# Patient Record
Sex: MALE | Race: BLACK OR AFRICAN AMERICAN | URBAN - METROPOLITAN AREA
[De-identification: names, ages, dates, MRNs, and addresses within clinical notes are randomized per-mention and may not be internally consistent; named-entity substitution may affect disease eponyms.]

---

## 2021-09-09 ENCOUNTER — OFFICE VISIT (OUTPATIENT)
Dept: INTERNAL MEDICINE CLINIC | Age: 56
End: 2021-09-09
Payer: COMMERCIAL

## 2021-09-09 VITALS
HEART RATE: 97 BPM | OXYGEN SATURATION: 98 % | DIASTOLIC BLOOD PRESSURE: 80 MMHG | WEIGHT: 177 LBS | SYSTOLIC BLOOD PRESSURE: 158 MMHG | BODY MASS INDEX: 30.38 KG/M2

## 2021-09-09 DIAGNOSIS — H53.9 VISION CHANGES: ICD-10-CM

## 2021-09-09 DIAGNOSIS — Z12.5 PROSTATE CANCER SCREENING: ICD-10-CM

## 2021-09-09 DIAGNOSIS — I10 ESSENTIAL HYPERTENSION: Primary | ICD-10-CM

## 2021-09-09 DIAGNOSIS — E78.2 MIXED HYPERLIPIDEMIA: ICD-10-CM

## 2021-09-09 DIAGNOSIS — Z12.11 COLON CANCER SCREENING: ICD-10-CM

## 2021-09-09 PROCEDURE — 99205 OFFICE O/P NEW HI 60 MIN: CPT | Performed by: INTERNAL MEDICINE

## 2021-09-09 RX ORDER — AMLODIPINE BESYLATE 5 MG/1
5 TABLET ORAL DAILY
Qty: 30 TABLET | Refills: 3 | Status: SHIPPED | OUTPATIENT
Start: 2021-09-09

## 2021-09-09 SDOH — ECONOMIC STABILITY: FOOD INSECURITY: WITHIN THE PAST 12 MONTHS, YOU WORRIED THAT YOUR FOOD WOULD RUN OUT BEFORE YOU GOT MONEY TO BUY MORE.: NEVER TRUE

## 2021-09-09 SDOH — ECONOMIC STABILITY: FOOD INSECURITY: WITHIN THE PAST 12 MONTHS, THE FOOD YOU BOUGHT JUST DIDN'T LAST AND YOU DIDN'T HAVE MONEY TO GET MORE.: NEVER TRUE

## 2021-09-09 ASSESSMENT — SOCIAL DETERMINANTS OF HEALTH (SDOH): HOW HARD IS IT FOR YOU TO PAY FOR THE VERY BASICS LIKE FOOD, HOUSING, MEDICAL CARE, AND HEATING?: NOT HARD AT ALL

## 2021-09-09 ASSESSMENT — PATIENT HEALTH QUESTIONNAIRE - PHQ9
1. LITTLE INTEREST OR PLEASURE IN DOING THINGS: 0
SUM OF ALL RESPONSES TO PHQ QUESTIONS 1-9: 0
SUM OF ALL RESPONSES TO PHQ QUESTIONS 1-9: 0
SUM OF ALL RESPONSES TO PHQ9 QUESTIONS 1 & 2: 0
2. FEELING DOWN, DEPRESSED OR HOPELESS: 0
SUM OF ALL RESPONSES TO PHQ QUESTIONS 1-9: 0

## 2021-10-09 ASSESSMENT — ENCOUNTER SYMPTOMS
GASTROINTESTINAL NEGATIVE: 1
RESPIRATORY NEGATIVE: 1

## 2021-10-09 NOTE — PROGRESS NOTES
Patient: Garrett Mcdonnell is a 64 y.o. male who presents today with the following Chief Complaint(s):    Chief Complaint   Patient presents with    Annual Exam         HPI He is here for a check up and new to the medical practice. He has a h/o hypertension, and current not taking medication. He is focused on eating better and regular physical activity. Current Outpatient Medications   Medication Sig Dispense Refill    amLODIPine (NORVASC) 5 MG tablet Take 1 tablet by mouth daily 30 tablet 3     No current facility-administered medications for this visit. Patient's past medical history, surgical history, family history, medications,and allergies  were all reviewed and updated as appropriate today. Review of Systems   Constitutional:        Elevated BMI at 30.4. see HPI. HENT: Negative. Eyes:        Complains of decreased visual acuity. Respiratory: Negative. Cardiovascular:        Hypertension, see HPI. Gastrointestinal: Negative. Endocrine:        Dyslipidemia, , HDL 38. Genitourinary: Negative. Musculoskeletal: Negative. Skin: Negative. Neurological: Negative. Psychiatric/Behavioral: Negative. Physical Exam  Constitutional:       General: He is not in acute distress. Appearance: He is well-developed. HENT:      Head: Normocephalic and atraumatic. Right Ear: External ear normal.      Left Ear: External ear normal.      Nose: Nose normal.   Eyes:      General: No scleral icterus. Conjunctiva/sclera: Conjunctivae normal.      Pupils: Pupils are equal, round, and reactive to light. Neck:      Thyroid: No thyromegaly. Cardiovascular:      Rate and Rhythm: Normal rate and regular rhythm. Heart sounds: Normal heart sounds. Pulmonary:      Effort: Pulmonary effort is normal.      Breath sounds: Normal breath sounds. Abdominal:      General: Bowel sounds are normal.      Palpations: Abdomen is soft. There is no mass. Musculoskeletal:      Cervical back: Normal range of motion and neck supple. Lymphadenopathy:      Cervical: No cervical adenopathy. Skin:     General: Skin is warm and dry. Neurological:      Mental Status: He is alert and oriented to person, place, and time. Deep Tendon Reflexes: Reflexes are normal and symmetric. Psychiatric:         Behavior: Behavior normal.         Thought Content: Thought content normal.         Judgment: Judgment normal.         Vitals:    09/09/21 1021   BP: (!) 158/80   Pulse: 97   SpO2: 98%       Assessment:  Encounter Diagnoses   Name Primary?  Essential hypertension Yes    Prostate cancer screening     Mixed hyperlipidemia     Colon cancer screening     Vision changes        Plan:  1. Essential hypertension  DASH and low sodium diet discussed. - COMPREHENSIVE METABOLIC PANEL; Future  - CBC WITH AUTO DIFFERENTIAL; Future  - TSH with Reflex; Future  - MICROALBUMIN / CREATININE URINE RATIO; Future  - amLODIPine (NORVASC) 5 MG tablet; Take 1 tablet by mouth daily  Dispense: 30 tablet; Refill: 3    2. Prostate cancer screening  Discussed rational behind screening.   - Psa screening; Future    3. Mixed hyperlipidemia  Heart healthy diet and statin compliance discussed. - Lipid Panel; Future    4. Colon cancer screening  Discussed importance and rational for screening.   - Jayashree Robertson MD, Gastroenterology, [facility]    5.  Vision changes  - External Referral To Ophthalmology

## 2021-10-19 ENCOUNTER — OFFICE VISIT (OUTPATIENT)
Dept: INTERNAL MEDICINE CLINIC | Age: 56
End: 2021-10-19
Payer: COMMERCIAL

## 2021-10-19 VITALS
BODY MASS INDEX: 29.6 KG/M2 | WEIGHT: 173.4 LBS | HEIGHT: 64 IN | DIASTOLIC BLOOD PRESSURE: 68 MMHG | SYSTOLIC BLOOD PRESSURE: 130 MMHG | OXYGEN SATURATION: 97 % | HEART RATE: 91 BPM

## 2021-10-19 DIAGNOSIS — M25.551 RIGHT HIP PAIN: ICD-10-CM

## 2021-10-19 DIAGNOSIS — N50.89 SCROTAL MASSES: ICD-10-CM

## 2021-10-19 DIAGNOSIS — I10 PRIMARY HYPERTENSION: Primary | ICD-10-CM

## 2021-10-19 PROCEDURE — 99214 OFFICE O/P EST MOD 30 MIN: CPT | Performed by: INTERNAL MEDICINE

## 2021-10-19 RX ORDER — MELOXICAM 7.5 MG/1
7.5 TABLET ORAL 2 TIMES DAILY PRN
Qty: 30 TABLET | Refills: 1 | Status: SHIPPED | OUTPATIENT
Start: 2021-10-19 | End: 2021-11-22

## 2021-10-19 NOTE — PROGRESS NOTES
Patient: Nicolette Campos is a 64 y.o. male who presents today with the following Chief Complaint(s):    Chief Complaint   Patient presents with    Hypertension    Hip Pain     right hip pain         HPIHe is here for a check up and f/u on hypertension. He is taking medication as prescribed and is aware of the importance of healthy dietary practices and regular physical activity. He complains of right hip pain without history of trauma. No prior injury. Hurting for about 1 year with ambulation, and flexion. Concerned about lumps in his scrotal sacs. Not painful. Present for years. May have changed in size. Current Outpatient Medications   Medication Sig Dispense Refill    amLODIPine (NORVASC) 5 MG tablet Take 1 tablet by mouth daily 30 tablet 3     No current facility-administered medications for this visit. Patient's past medical history, surgical history, family history, medications,and allergies  were all reviewed and updated as appropriate today. Review of Systems   Constitutional: Negative. HENT: Negative. Eyes: Negative. Respiratory: Negative. Cardiovascular:        Hypertension treated with amlodipine. Gastrointestinal: Negative. Genitourinary:        Scrotal masses, see HPI. Musculoskeletal:        Right hip pain. See HPI. Skin: Negative. Neurological: Negative. Psychiatric/Behavioral: Negative. Physical Exam  Constitutional:       General: He is not in acute distress. Appearance: He is well-developed. HENT:      Head: Normocephalic and atraumatic. Right Ear: External ear normal.      Left Ear: External ear normal.      Nose: Nose normal.   Eyes:      General: No scleral icterus. Conjunctiva/sclera: Conjunctivae normal.      Pupils: Pupils are equal, round, and reactive to light. Neck:      Thyroid: No thyromegaly. Cardiovascular:      Rate and Rhythm: Normal rate and regular rhythm.       Heart sounds: Normal heart sounds. Pulmonary:      Effort: Pulmonary effort is normal.      Breath sounds: Normal breath sounds. Abdominal:      General: Bowel sounds are normal.      Palpations: Abdomen is soft. There is no mass. Genitourinary:     Comments: Cystocele type masses in both scrotal sacs. Musculoskeletal:      Cervical back: Normal range of motion and neck supple. Comments: Right hip, rom ok. Some some with flexion of hip. Tender joint to palpate. Lymphadenopathy:      Cervical: No cervical adenopathy. Skin:     General: Skin is warm and dry. Neurological:      Mental Status: He is alert and oriented to person, place, and time. Deep Tendon Reflexes: Reflexes are normal and symmetric. Psychiatric:         Behavior: Behavior normal.         Thought Content: Thought content normal.         Judgment: Judgment normal.         Vitals:    10/19/21 1504   BP: 130/68   Pulse: 91   SpO2: 97%       Assessment:   Diagnosis Orders   1. Primary hypertension  Continue amlodipine. DASH diet discussed. 2. Right hip pain  Suspect arthritis. Exercises given. XR HIP RIGHT (2-3 VIEWS)    External Referral To Orthopedic Surgery   3. Scrotal masses  Discussed differential diagnosis. Kianna Mittal MD, Urology, [facility]       Plan:  See plans above.

## 2021-10-22 ENCOUNTER — HOSPITAL ENCOUNTER (OUTPATIENT)
Dept: GENERAL RADIOLOGY | Age: 56
Discharge: HOME | End: 2021-10-22
Payer: COMMERCIAL

## 2021-10-22 ENCOUNTER — HOSPITAL ENCOUNTER (OUTPATIENT)
Age: 56
Discharge: HOME | End: 2021-10-22
Payer: COMMERCIAL

## 2021-10-22 DIAGNOSIS — M25.551 RIGHT HIP PAIN: ICD-10-CM

## 2021-10-22 PROCEDURE — 73502 X-RAY EXAM HIP UNI 2-3 VIEWS: CPT

## 2021-10-31 ASSESSMENT — ENCOUNTER SYMPTOMS
GASTROINTESTINAL NEGATIVE: 1
RESPIRATORY NEGATIVE: 1
EYES NEGATIVE: 1

## 2021-11-22 RX ORDER — MELOXICAM 7.5 MG/1
TABLET ORAL
Qty: 30 TABLET | Refills: 1 | Status: SHIPPED | OUTPATIENT
Start: 2021-11-22 | End: 2022-01-12

## 2022-01-12 RX ORDER — MELOXICAM 7.5 MG/1
TABLET ORAL
Qty: 30 TABLET | Refills: 1 | Status: SHIPPED | OUTPATIENT
Start: 2022-01-12

## 2023-02-08 ENCOUNTER — TELEPHONE (OUTPATIENT)
Dept: INTERNAL MEDICINE CLINIC | Age: 58
End: 2023-02-08

## 2024-01-05 ENCOUNTER — OFFICE VISIT (OUTPATIENT)
Dept: INTERNAL MEDICINE CLINIC | Age: 59
End: 2024-01-05

## 2024-01-05 VITALS
OXYGEN SATURATION: 97 % | WEIGHT: 173.4 LBS | DIASTOLIC BLOOD PRESSURE: 78 MMHG | HEART RATE: 87 BPM | BODY MASS INDEX: 29.76 KG/M2 | SYSTOLIC BLOOD PRESSURE: 130 MMHG

## 2024-01-05 DIAGNOSIS — Z12.5 PROSTATE CANCER SCREENING: ICD-10-CM

## 2024-01-05 DIAGNOSIS — E78.2 MIXED HYPERLIPIDEMIA: ICD-10-CM

## 2024-01-05 DIAGNOSIS — M16.11 ARTHRITIS OF RIGHT HIP: Primary | ICD-10-CM

## 2024-01-05 DIAGNOSIS — I10 ESSENTIAL HYPERTENSION: ICD-10-CM

## 2024-01-05 RX ORDER — MELOXICAM 7.5 MG/1
TABLET ORAL
Qty: 30 TABLET | Refills: 1 | Status: SHIPPED | OUTPATIENT
Start: 2024-01-05

## 2024-01-05 RX ORDER — AMLODIPINE BESYLATE 5 MG/1
5 TABLET ORAL DAILY
Qty: 30 TABLET | Refills: 3 | Status: CANCELLED | OUTPATIENT
Start: 2024-01-05

## 2024-01-05 ASSESSMENT — PATIENT HEALTH QUESTIONNAIRE - PHQ9
1. LITTLE INTEREST OR PLEASURE IN DOING THINGS: 0
SUM OF ALL RESPONSES TO PHQ QUESTIONS 1-9: 0
2. FEELING DOWN, DEPRESSED OR HOPELESS: 0
SUM OF ALL RESPONSES TO PHQ9 QUESTIONS 1 & 2: 0
SUM OF ALL RESPONSES TO PHQ QUESTIONS 1-9: 0

## 2024-01-05 NOTE — PROGRESS NOTES
Patient: Alcides Stanley is a 58 y.o. male who presents today with the following Chief Complaint(s):    Chief Complaint   Patient presents with    Annual Exam    Hip Pain         HPIright hip pain. Severe arthritis.   Off b/p med.   Exercises, eats healthy.   Current Outpatient Medications   Medication Sig Dispense Refill    meloxicam (MOBIC) 7.5 MG tablet TAKE 1 TABLET BY MOUTH TWICE DAILY AS NEEDED FOR PAIN 30 tablet 1    amLODIPine (NORVASC) 5 MG tablet Take 1 tablet by mouth daily (Patient not taking: Reported on 1/5/2024) 30 tablet 3     No current facility-administered medications for this visit.       Patient's past medical history, surgical history, family history, medications,and allergies  were all reviewed and updated as appropriate today.      Review of Systems      Physical Exam    Vitals:    01/05/24 1232   BP: 130/78   Pulse: 87   SpO2: 97%       Assessment:  Encounter Diagnosis   Name Primary?    Essential hypertension        Plan:  1. Essential hypertension  ***

## 2024-01-13 ASSESSMENT — ENCOUNTER SYMPTOMS
EYES NEGATIVE: 1
RESPIRATORY NEGATIVE: 1
GASTROINTESTINAL NEGATIVE: 1

## 2024-02-05 DIAGNOSIS — M16.11 ARTHRITIS OF RIGHT HIP: ICD-10-CM

## 2024-02-05 RX ORDER — MELOXICAM 7.5 MG/1
TABLET ORAL
Qty: 30 TABLET | Refills: 1 | Status: SHIPPED | OUTPATIENT
Start: 2024-02-05

## 2024-02-05 NOTE — TELEPHONE ENCOUNTER
Medication:   Requested Prescriptions     Pending Prescriptions Disp Refills    meloxicam (MOBIC) 7.5 MG tablet 30 tablet 1     Sig: TAKE 1 TABLET BY MOUTH TWICE DAILY AS NEEDED FOR PAIN        Last Filled:  1/5/24    Last appt: 1/5/2024   Next appt: 3/15/2024    Last OARRS:        No data to display

## 2024-03-07 DIAGNOSIS — M16.11 ARTHRITIS OF RIGHT HIP: ICD-10-CM

## 2024-03-07 RX ORDER — MELOXICAM 7.5 MG/1
TABLET ORAL
Qty: 30 TABLET | Refills: 1 | OUTPATIENT
Start: 2024-03-07

## 2024-03-10 DIAGNOSIS — M16.11 ARTHRITIS OF RIGHT HIP: ICD-10-CM

## 2024-03-12 DIAGNOSIS — M16.11 ARTHRITIS OF RIGHT HIP: ICD-10-CM

## 2024-03-12 RX ORDER — MELOXICAM 7.5 MG/1
TABLET ORAL
Qty: 30 TABLET | Refills: 1 | OUTPATIENT
Start: 2024-03-12

## 2024-03-12 RX ORDER — MELOXICAM 7.5 MG/1
TABLET ORAL
Qty: 30 TABLET | Refills: 1 | Status: SHIPPED | OUTPATIENT
Start: 2024-03-12

## 2024-03-12 NOTE — TELEPHONE ENCOUNTER
Medication:   Requested Prescriptions     Pending Prescriptions Disp Refills    meloxicam (MOBIC) 7.5 MG tablet 30 tablet 1     Sig: TAKE 1 TABLET BY MOUTH TWICE DAILY AS NEEDED FOR PAIN     Last Filled:  2/5/24    Last appt: 1/5/2024   Next appt: 3/12/2024    Last OARRS:        No data to display

## 2024-04-08 DIAGNOSIS — M16.11 ARTHRITIS OF RIGHT HIP: ICD-10-CM

## 2024-04-09 RX ORDER — MELOXICAM 7.5 MG/1
TABLET ORAL
Qty: 30 TABLET | Refills: 1 | Status: SHIPPED | OUTPATIENT
Start: 2024-04-09

## 2024-04-09 NOTE — TELEPHONE ENCOUNTER
Medication:   Requested Prescriptions     Pending Prescriptions Disp Refills    meloxicam (MOBIC) 7.5 MG tablet [Pharmacy Med Name: MELOXICAM 7.5MG TABLETS] 30 tablet 1     Sig: TAKE 1 TABLET BY MOUTH TWICE DAILY AS NEEDED FOR PAIN      3/26/2024   Last Filled:      Patient Phone Number: [phone] (home)     Last appt: 1/5/2024   Next appt: 4/12/2024    Last OARRS:        No data to display

## 2024-05-16 DIAGNOSIS — M16.11 ARTHRITIS OF RIGHT HIP: ICD-10-CM

## 2024-05-16 NOTE — TELEPHONE ENCOUNTER
Medication:   Requested Prescriptions     Pending Prescriptions Disp Refills    meloxicam (MOBIC) 7.5 MG tablet [Pharmacy Med Name: MELOXICAM 7.5MG TABLETS] 30 tablet 1     Sig: TAKE 1 TABLET BY MOUTH TWICE DAILY AS NEEDED FOR PAIN        Last Filled: 4/9/4     Last appt: 1/5/2024   Next appt: Visit date not found   Return in about 3 months (around 4/5/2024).  Last OARRS:        No data to display

## 2024-05-18 RX ORDER — MELOXICAM 7.5 MG/1
TABLET ORAL
Qty: 30 TABLET | Refills: 1 | Status: SHIPPED | OUTPATIENT
Start: 2024-05-18

## 2024-08-18 DIAGNOSIS — I10 ESSENTIAL HYPERTENSION: ICD-10-CM

## 2024-08-19 DIAGNOSIS — I10 ESSENTIAL HYPERTENSION: ICD-10-CM

## 2024-08-19 RX ORDER — AMLODIPINE BESYLATE 5 MG/1
5 TABLET ORAL DAILY
Qty: 30 TABLET | Refills: 5 | Status: SHIPPED | OUTPATIENT
Start: 2024-08-19

## 2024-08-19 RX ORDER — AMLODIPINE BESYLATE 5 MG/1
5 TABLET ORAL DAILY
Qty: 90 TABLET | OUTPATIENT
Start: 2024-08-19

## 2024-12-04 DIAGNOSIS — M25.551 RIGHT HIP PAIN: Primary | ICD-10-CM

## 2024-12-04 RX ORDER — TRAMADOL HYDROCHLORIDE 50 MG/1
50 TABLET ORAL 3 TIMES DAILY PRN
Qty: 21 TABLET | Refills: 0 | Status: SHIPPED | OUTPATIENT
Start: 2024-12-04 | End: 2024-12-11

## 2024-12-21 ENCOUNTER — HOSPITAL ENCOUNTER (OUTPATIENT)
Age: 59
Discharge: HOME | End: 2024-12-21
Payer: COMMERCIAL

## 2024-12-21 DIAGNOSIS — I10 ESSENTIAL HYPERTENSION: ICD-10-CM

## 2024-12-21 DIAGNOSIS — Z12.5 PROSTATE CANCER SCREENING: ICD-10-CM

## 2024-12-21 DIAGNOSIS — E78.2 MIXED HYPERLIPIDEMIA: ICD-10-CM

## 2024-12-21 LAB
ALBUMIN SERPL-MCNC: 4.4 G/DL (ref 3.4–5)
ALBUMIN/GLOB SERPL: 1.3 {RATIO} (ref 1.1–2.2)
ALP SERPL-CCNC: 94 U/L (ref 40–129)
ALT SERPL-CCNC: 24 U/L (ref 10–40)
ANION GAP SERPL CALCULATED.3IONS-SCNC: 11 MMOL/L (ref 3–16)
AST SERPL-CCNC: 25 U/L (ref 15–37)
BASOPHILS # BLD: 0 K/UL (ref 0–0.2)
BASOPHILS NFR BLD: 0.7 %
BILIRUB SERPL-MCNC: 0.8 MG/DL (ref 0–1)
BUN SERPL-MCNC: 12 MG/DL (ref 7–20)
CALCIUM SERPL-MCNC: 9.7 MG/DL (ref 8.3–10.6)
CHLORIDE SERPL-SCNC: 101 MMOL/L (ref 99–110)
CHOLEST SERPL-MCNC: 149 MG/DL (ref 0–199)
CO2 SERPL-SCNC: 28 MMOL/L (ref 21–32)
CREAT SERPL-MCNC: 1 MG/DL (ref 0.9–1.3)
CREAT UR-MCNC: 83.8 MG/DL (ref 39–259)
DEPRECATED RDW RBC AUTO: 14.1 % (ref 12.4–15.4)
EOSINOPHIL # BLD: 0.2 K/UL (ref 0–0.6)
EOSINOPHIL NFR BLD: 4.3 %
GFR SERPLBLD CREATININE-BSD FMLA CKD-EPI: 86 ML/MIN/{1.73_M2}
GLUCOSE SERPL-MCNC: 86 MG/DL (ref 70–99)
HCT VFR BLD AUTO: 47.5 % (ref 40.5–52.5)
HDLC SERPL-MCNC: 40 MG/DL (ref 40–60)
HGB BLD-MCNC: 15.4 G/DL (ref 13.5–17.5)
LDLC SERPL CALC-MCNC: 95 MG/DL
LYMPHOCYTES # BLD: 1.6 K/UL (ref 1–5.1)
LYMPHOCYTES NFR BLD: 29 %
MCH RBC QN AUTO: 27.1 PG (ref 26–34)
MCHC RBC AUTO-ENTMCNC: 32.5 G/DL (ref 31–36)
MCV RBC AUTO: 83.5 FL (ref 80–100)
MICROALBUMIN UR DL<=1MG/L-MCNC: <1.2 MG/DL
MICROALBUMIN/CREAT UR: NORMAL MG/G (ref 0–30)
MONOCYTES # BLD: 0.5 K/UL (ref 0–1.3)
MONOCYTES NFR BLD: 9.6 %
NEUTROPHILS # BLD: 3.1 K/UL (ref 1.7–7.7)
NEUTROPHILS NFR BLD: 56.4 %
PLATELET # BLD AUTO: 243 K/UL (ref 135–450)
PMV BLD AUTO: 9.4 FL (ref 5–10.5)
POTASSIUM SERPL-SCNC: 4.3 MMOL/L (ref 3.5–5.1)
PROT SERPL-MCNC: 7.8 G/DL (ref 6.4–8.2)
PSA SERPL DL<=0.01 NG/ML-MCNC: 5.85 NG/ML (ref 0–4)
RBC # BLD AUTO: 5.69 M/UL (ref 4.2–5.9)
SODIUM SERPL-SCNC: 140 MMOL/L (ref 136–145)
TRIGL SERPL-MCNC: 69 MG/DL (ref 0–150)
TSH SERPL DL<=0.005 MIU/L-ACNC: 1.01 UIU/ML (ref 0.27–4.2)
VLDLC SERPL CALC-MCNC: 14 MG/DL
WBC # BLD AUTO: 5.5 K/UL (ref 4–11)

## 2024-12-21 PROCEDURE — 82043 UR ALBUMIN QUANTITATIVE: CPT

## 2024-12-21 PROCEDURE — 84443 ASSAY THYROID STIM HORMONE: CPT

## 2024-12-21 PROCEDURE — 84153 ASSAY OF PSA TOTAL: CPT

## 2024-12-21 PROCEDURE — 85025 COMPLETE CBC W/AUTO DIFF WBC: CPT

## 2024-12-21 PROCEDURE — 80053 COMPREHEN METABOLIC PANEL: CPT

## 2024-12-21 PROCEDURE — 80061 LIPID PANEL: CPT

## 2024-12-21 PROCEDURE — 36415 COLL VENOUS BLD VENIPUNCTURE: CPT

## 2024-12-21 PROCEDURE — 82570 ASSAY OF URINE CREATININE: CPT

## 2025-01-01 DIAGNOSIS — M16.11 ARTHRITIS OF RIGHT HIP: ICD-10-CM

## 2025-01-02 RX ORDER — MELOXICAM 7.5 MG/1
TABLET ORAL
Qty: 30 TABLET | Refills: 1 | Status: SHIPPED | OUTPATIENT
Start: 2025-01-02

## 2025-01-02 NOTE — TELEPHONE ENCOUNTER
Medication:   Requested Prescriptions     Pending Prescriptions Disp Refills    meloxicam (MOBIC) 7.5 MG tablet 30 tablet 1     Sig: TAKE 1 TABLET BY MOUTH TWICE DAILY AS NEEDED FOR PAIN        Last Filled:      Patient Phone Number: [phone] (home)     Last appt: 1/5/2024   Next appt: 1/7/2025    Last OARRS:        No data to display

## 2025-01-04 ASSESSMENT — PATIENT HEALTH QUESTIONNAIRE - PHQ9
SUM OF ALL RESPONSES TO PHQ QUESTIONS 1-9: 0
1. LITTLE INTEREST OR PLEASURE IN DOING THINGS: NOT AT ALL
SUM OF ALL RESPONSES TO PHQ QUESTIONS 1-9: 0
SUM OF ALL RESPONSES TO PHQ9 QUESTIONS 1 & 2: 0
SUM OF ALL RESPONSES TO PHQ9 QUESTIONS 1 & 2: 0
2. FEELING DOWN, DEPRESSED OR HOPELESS: NOT AT ALL
SUM OF ALL RESPONSES TO PHQ QUESTIONS 1-9: 0
1. LITTLE INTEREST OR PLEASURE IN DOING THINGS: NOT AT ALL
2. FEELING DOWN, DEPRESSED OR HOPELESS: NOT AT ALL
SUM OF ALL RESPONSES TO PHQ QUESTIONS 1-9: 0

## 2025-01-07 ENCOUNTER — OFFICE VISIT (OUTPATIENT)
Dept: INTERNAL MEDICINE CLINIC | Age: 60
End: 2025-01-07

## 2025-01-07 VITALS
BODY MASS INDEX: 29.52 KG/M2 | WEIGHT: 172 LBS | SYSTOLIC BLOOD PRESSURE: 156 MMHG | HEART RATE: 82 BPM | DIASTOLIC BLOOD PRESSURE: 86 MMHG | OXYGEN SATURATION: 97 %

## 2025-01-07 DIAGNOSIS — M25.551 RIGHT HIP PAIN: ICD-10-CM

## 2025-01-07 DIAGNOSIS — R97.20 ELEVATED PSA: ICD-10-CM

## 2025-01-07 DIAGNOSIS — Z12.11 COLON CANCER SCREENING: ICD-10-CM

## 2025-01-07 DIAGNOSIS — I10 PRIMARY HYPERTENSION: ICD-10-CM

## 2025-01-07 DIAGNOSIS — Z23 NEED FOR SHINGLES VACCINE: ICD-10-CM

## 2025-01-07 DIAGNOSIS — E78.2 MIXED HYPERLIPIDEMIA: ICD-10-CM

## 2025-01-07 RX ORDER — AMLODIPINE BESYLATE 5 MG/1
5 TABLET ORAL DAILY
Qty: 30 TABLET | Refills: 5 | Status: SHIPPED | OUTPATIENT
Start: 2025-01-07

## 2025-01-07 RX ORDER — ZOSTER VACCINE RECOMBINANT, ADJUVANTED 50 MCG/0.5
0.5 KIT INTRAMUSCULAR ONCE
Qty: 0.5 ML | Refills: 0
Start: 2025-01-07 | End: 2025-01-07

## 2025-01-07 RX ORDER — MELOXICAM 15 MG/1
15 TABLET ORAL DAILY PRN
Qty: 30 TABLET | Refills: 1 | Status: SHIPPED | OUTPATIENT
Start: 2025-01-07

## 2025-01-07 RX ORDER — TRAMADOL HYDROCHLORIDE 50 MG/1
50 TABLET ORAL 3 TIMES DAILY PRN
Qty: 21 TABLET | Refills: 0 | Status: SHIPPED | OUTPATIENT
Start: 2025-01-07 | End: 2025-01-14

## 2025-01-07 SDOH — ECONOMIC STABILITY: FOOD INSECURITY: WITHIN THE PAST 12 MONTHS, YOU WORRIED THAT YOUR FOOD WOULD RUN OUT BEFORE YOU GOT MONEY TO BUY MORE.: NEVER TRUE

## 2025-01-07 SDOH — ECONOMIC STABILITY: INCOME INSECURITY: HOW HARD IS IT FOR YOU TO PAY FOR THE VERY BASICS LIKE FOOD, HOUSING, MEDICAL CARE, AND HEATING?: NOT HARD AT ALL

## 2025-01-07 SDOH — ECONOMIC STABILITY: FOOD INSECURITY: WITHIN THE PAST 12 MONTHS, THE FOOD YOU BOUGHT JUST DIDN'T LAST AND YOU DIDN'T HAVE MONEY TO GET MORE.: NEVER TRUE

## 2025-01-07 NOTE — PROGRESS NOTES
Patient: Alcides Stanley is a 59 y.o. male who presents today with the following Chief Complaint(s):    Chief Complaint   Patient presents with    Annual Exam    Hypertension         HPIpush ups up to 300 daily. Does walk [id number] daily.     Current Outpatient Medications   Medication Sig Dispense Refill    meloxicam (MOBIC) 7.5 MG tablet TAKE 1 TABLET BY MOUTH TWICE DAILY AS NEEDED FOR PAIN 30 tablet 1    amLODIPine (NORVASC) 5 MG tablet Take 1 tablet by mouth daily 30 tablet 5     No current facility-administered medications for this visit.       Patient's past medical history, surgical history, family history, medications,and allergies  were all reviewed and updated as appropriate today.      Review of Systems      Physical Exam    Vitals:    01/07/25 1542   BP: (!) 172/84   Pulse: 82   SpO2: 97%       Assessment:  Encounter Diagnoses   Name Primary?    Elevated PSA Yes    Right hip pain     Primary hypertension     Colon cancer screening        Plan:  1. Elevated PSA  ***  - [facility] - Jean Paul Duarte MD, Urology, [facility]    2. Right hip pain  ***    3. Primary hypertension  ***    4. Colon cancer screening  ***

## 2025-01-11 ASSESSMENT — ENCOUNTER SYMPTOMS
EYES NEGATIVE: 1
GASTROINTESTINAL NEGATIVE: 1
RESPIRATORY NEGATIVE: 1

## 2025-01-11 NOTE — PROGRESS NOTES
Patient: Alcides Stanley is a 59 y.o. male who presents today with the following Chief Complaint(s):    Chief Complaint   Patient presents with    Annual Exam    Hypertension         HPIHe is here for an annual physical exam and f/u on hypertension and severe right hip arthritis with secondary pain and stiffness. He is not taking medication prescribed on a regular basis for b/p and is aware of the importance of healthy dietary practices and regular physical activity. He is doing both of these to the tune of 300 push ups and 15,000 steps most days a week.           Current Outpatient Medications   Medication Sig Dispense Refill    traMADol (ULTRAM) 50 MG tablet Take 1 tablet by mouth 3 times daily as needed for Pain for up to 7 days. Intended supply: 7 days. Take lowest dose possible to manage pain Max Daily Amount: 150 mg 21 tablet 0    meloxicam (MOBIC) 15 MG tablet Take 1 tablet by mouth daily as needed for Pain 30 tablet 1    amLODIPine (NORVASC) 5 MG tablet Take 1 tablet by mouth daily 30 tablet 5    meloxicam (MOBIC) 7.5 MG tablet TAKE 1 TABLET BY MOUTH TWICE DAILY AS NEEDED FOR PAIN 30 tablet 1    amLODIPine (NORVASC) 5 MG tablet Take 1 tablet by mouth daily 30 tablet 5     No current facility-administered medications for this visit.       Patient's past medical history, surgical history, family history, medications,and allergies  were all reviewed and updated as appropriate today.      Review of Systems   Constitutional: Negative.    HENT: Negative.     Eyes: Negative.    Respiratory: Negative.     Cardiovascular:         Hypertension, see HPI.    Gastrointestinal: Negative.    Endocrine:        Hyperlipidemia, not on statin. LDL 95.    Genitourinary:         Elevated PSA noted at 5.85. No LUTS.   Musculoskeletal:         Right hip pain. See HPI.    Skin: Negative.    Neurological: Negative.    Psychiatric/Behavioral: Negative.           Physical Exam  Constitutional:       General: He is not in acute

## 2025-01-20 SDOH — HEALTH STABILITY: PHYSICAL HEALTH: ON AVERAGE, HOW MANY MINUTES DO YOU ENGAGE IN EXERCISE AT THIS LEVEL?: 30 MIN

## 2025-01-20 SDOH — HEALTH STABILITY: PHYSICAL HEALTH: ON AVERAGE, HOW MANY DAYS PER WEEK DO YOU ENGAGE IN MODERATE TO STRENUOUS EXERCISE (LIKE A BRISK WALK)?: 5 DAYS

## 2025-01-23 ENCOUNTER — OFFICE VISIT (OUTPATIENT)
Dept: ORTHOPEDIC SURGERY | Age: 60
End: 2025-01-23
Payer: COMMERCIAL

## 2025-01-23 VITALS — HEIGHT: 65 IN | WEIGHT: 167 LBS | BODY MASS INDEX: 27.82 KG/M2

## 2025-01-23 DIAGNOSIS — M16.11 PRIMARY OSTEOARTHRITIS OF RIGHT HIP: Primary | ICD-10-CM

## 2025-01-23 PROCEDURE — 99203 OFFICE O/P NEW LOW 30 MIN: CPT | Performed by: ORTHOPAEDIC SURGERY

## 2025-01-23 NOTE — PROGRESS NOTES
[facility]  Office Visit    Chief Complaint: Right hip pain    HPI:  Alcides Stanley is a 59 y.o. who is here for initial evaluation of right hip pain.  He reports pain that has been worsening over the last 4 years.  There is no history of injury or surgery.  Pain is present on a daily basis and is present in the groin.  Pain is worse after standing long periods of time such as at the end of a long workday. The patient has difficulty putting on socks and shoes, difficulty getting out of a car, difficulty with ambulation and doing activities of daily living including pain at night.  Pain at rest is 7 and with activities 9-10/10.  He takes meloxicam.  He had an injection in the hip about 4 years ago.  He denies tobacco use, heart or lung issues, diabetes, history of blood clots, blood thinners.      Past Medical History:   Diagnosis Date    Hypertension     Osteoarthritis     Sarcoid         ROS:  Constitutional: denies fever, chills, weight loss  MSK: denies pain in other joints, muscle aches  Neurological: denies numbness, tingling, weakness    Exam:  Ht 1.651 m (5' 5\")   Wt 75.8 kg (167 lb)   BMI 27.79 kg/m²      Appearance: sitting in exam room chair, appears to be in no acute distress, awake and alert  Resp: unlabored breathing on room air  Skin: warm, dry and intact with out erythema or significant increased temperature  Neuro: grossly intact both lower extremities. Intact sensation to light touch. Motor exam 4+ to 5/5 in all major motor groups.  RLE: Examination demonstrates pain with logroll and Stinchfield.  There is brisk capillary refill.  Strength is 5/5 in hamstrings, quads, hip flexors.     Imaging:  AP pelvis, AP and frog-leg lateral views of the right hip were performed and interpreted today.  Significant for severe degenerative changes with no remaining joint space.  There are large periarticular osteophytes.    Assessment:  Severe right hip osteoarthritis    Plan:  We

## 2025-01-24 ENCOUNTER — HOSPITAL ENCOUNTER (OUTPATIENT)
Age: 60
Setting detail: OUTPATIENT SURGERY
Discharge: HOME | End: 2025-01-24
Attending: INTERNAL MEDICINE | Admitting: INTERNAL MEDICINE
Payer: COMMERCIAL

## 2025-01-24 ENCOUNTER — ANESTHESIA (OUTPATIENT)
Dept: ENDOSCOPY | Age: 60
End: 2025-01-24
Payer: COMMERCIAL

## 2025-01-24 ENCOUNTER — ANESTHESIA EVENT (OUTPATIENT)
Dept: ENDOSCOPY | Age: 60
End: 2025-01-24
Payer: COMMERCIAL

## 2025-01-24 ENCOUNTER — APPOINTMENT (OUTPATIENT)
Dept: ENDOSCOPY | Age: 60
End: 2025-01-24
Attending: INTERNAL MEDICINE
Payer: COMMERCIAL

## 2025-01-24 VITALS
SYSTOLIC BLOOD PRESSURE: 139 MMHG | BODY MASS INDEX: 27.82 KG/M2 | DIASTOLIC BLOOD PRESSURE: 87 MMHG | RESPIRATION RATE: 16 BRPM | HEIGHT: 65 IN | WEIGHT: 167 LBS | OXYGEN SATURATION: 98 % | HEART RATE: 82 BPM | TEMPERATURE: 97 F

## 2025-01-24 PROCEDURE — 7100000011 HC PHASE II RECOVERY - ADDTL 15 MIN: Performed by: INTERNAL MEDICINE

## 2025-01-24 PROCEDURE — 3700000001 HC ADD 15 MINUTES (ANESTHESIA): Performed by: INTERNAL MEDICINE

## 2025-01-24 PROCEDURE — 3700000000 HC ANESTHESIA ATTENDED CARE: Performed by: INTERNAL MEDICINE

## 2025-01-24 PROCEDURE — 7100000010 HC PHASE II RECOVERY - FIRST 15 MIN: Performed by: INTERNAL MEDICINE

## 2025-01-24 PROCEDURE — 2580000003 HC RX 258: Performed by: ANESTHESIOLOGY

## 2025-01-24 PROCEDURE — 6360000002 HC RX W HCPCS

## 2025-01-24 PROCEDURE — 3609027000 HC COLONOSCOPY: Performed by: INTERNAL MEDICINE

## 2025-01-24 RX ORDER — PROPOFOL 10 MG/ML
INJECTION, EMULSION INTRAVENOUS
Status: DISCONTINUED | OUTPATIENT
Start: 2025-01-24 | End: 2025-01-24 | Stop reason: SDUPTHER

## 2025-01-24 RX ORDER — LIDOCAINE HYDROCHLORIDE 20 MG/ML
INJECTION, SOLUTION EPIDURAL; INFILTRATION; INTRACAUDAL; PERINEURAL
Status: DISCONTINUED | OUTPATIENT
Start: 2025-01-24 | End: 2025-01-24 | Stop reason: SDUPTHER

## 2025-01-24 RX ORDER — GLYCOPYRROLATE 0.2 MG/ML
INJECTION INTRAMUSCULAR; INTRAVENOUS
Status: DISCONTINUED | OUTPATIENT
Start: 2025-01-24 | End: 2025-01-24 | Stop reason: SDUPTHER

## 2025-01-24 RX ORDER — SODIUM CHLORIDE, SODIUM LACTATE, POTASSIUM CHLORIDE, CALCIUM CHLORIDE 600; 310; 30; 20 MG/100ML; MG/100ML; MG/100ML; MG/100ML
INJECTION, SOLUTION INTRAVENOUS CONTINUOUS
Status: DISCONTINUED | OUTPATIENT
Start: 2025-01-24 | End: 2025-01-24 | Stop reason: HOSPADM

## 2025-01-24 RX ADMIN — PROPOFOL 170 MCG/KG/MIN: 10 INJECTION, EMULSION INTRAVENOUS at 09:20

## 2025-01-24 RX ADMIN — GLYCOPYRROLATE 0.2 MG: 0.2 INJECTION INTRAMUSCULAR; INTRAVENOUS at 09:18

## 2025-01-24 RX ADMIN — LIDOCAINE HYDROCHLORIDE 40 MG: 20 INJECTION, SOLUTION EPIDURAL; INFILTRATION; INTRACAUDAL; PERINEURAL at 09:19

## 2025-01-24 RX ADMIN — PROPOFOL 80 MG: 10 INJECTION, EMULSION INTRAVENOUS at 09:19

## 2025-01-24 RX ADMIN — SODIUM CHLORIDE, SODIUM LACTATE, POTASSIUM CHLORIDE, AND CALCIUM CHLORIDE: .6; .31; .03; .02 INJECTION, SOLUTION INTRAVENOUS at 09:17

## 2025-01-24 ASSESSMENT — PAIN - FUNCTIONAL ASSESSMENT
PAIN_FUNCTIONAL_ASSESSMENT: WONG-BAKER FACES
PAIN_FUNCTIONAL_ASSESSMENT: NONE - DENIES PAIN

## 2025-01-24 NOTE — PROGRESS NOTES
Ambulatory Surgery/Procedure Discharge Note    Vitals:    01/24/25 0955   BP: 125/77   Pulse: 83   Resp: 16   Temp: 97.1 °F (36.2 °C)   SpO2: 99%       In: 250 [I.V.:250]  Out: -     Restroom use offered before discharge.  Yes  Discharge instructions were read at bedside including the post op appointment. Pt is toileted and remains safe.     Pain assessment:  none           Patient discharged to home/self care. Patient discharged via wheel chair by this nurse to waiting family/S.O.       1/24/2025 9:56 AM

## 2025-01-24 NOTE — ANESTHESIA PRE PROCEDURE
Department of Anesthesiology  Preprocedure Note       Name:  Alcides Stanley   Age:  59 y.o.  :  1965                                          MRN:  [id number]         Date:  2025      Surgeon: Surgeon(s):  Rachel Padgett MD    Procedure: Procedure(s):  COLONOSCOPY    Medications prior to admission:   Prior to Admission medications    Medication Sig Start Date End Date Taking? Authorizing Provider   meloxicam (MOBIC) 15 MG tablet Take 1 tablet by mouth daily as needed for Pain 25   Foster Shepherd MD   amLODIPine (NORVASC) 5 MG tablet Take 1 tablet by mouth daily 25   Foster Shepherd MD   meloxicam (MOBIC) 7.5 MG tablet TAKE 1 TABLET BY MOUTH TWICE DAILY AS NEEDED FOR PAIN 25   Foster Shepherd MD   amLODIPine (NORVASC) 5 MG tablet Take 1 tablet by mouth daily 24   Fsoter Shepherd MD       Current medications:    Current Facility-Administered Medications   Medication Dose Route Frequency Provider Last Rate Last Admin   • lactated ringers infusion   IntraVENous Continuous Don Peña MD           Allergies:  No Known Allergies    Problem List:    Patient Active Problem List   Diagnosis Code   • Elevated blood pressure reading R03.0       Past Medical History:        Diagnosis Date   • Hypertension    • Osteoarthritis    • Sarcoid        Past Surgical History:  No past surgical history on file.    Social History:    Social History     Tobacco Use   • Smoking status: Never   • Smokeless tobacco: Not on file   Substance Use Topics   • Alcohol use: No                                Counseling given: Not Answered      Vital Signs (Current): There were no vitals filed for this visit.                                           BP Readings from Last 3 Encounters:   25 (!) 156/86   24 130/78   10/19/21 130/68       NPO Status:                                                                                 BMI:   Wt Readings from Last 3 Encounters:   25 75.8 kg (167 lb)   25

## 2025-01-24 NOTE — H&P
History and Physical / Pre-Sedation Assessment    Patient:  Alcides Stanley   :   1965     Intended Procedure:  colonoscopy    HPI: colon cancer screening    Past Medical History:   has a past medical history of Hypertension, Osteoarthritis, and Sarcoid.     Past Surgical History:   has no past surgical history on file.     Medications:  Prior to Admission medications    Medication Sig Start Date End Date Taking? Authorizing Provider   amLODIPine (NORVASC) 5 MG tablet Take 1 tablet by mouth daily 25  Yes Foster Shepherd MD   meloxicam (MOBIC) 7.5 MG tablet TAKE 1 TABLET BY MOUTH TWICE DAILY AS NEEDED FOR PAIN 25  Yes Foster Shepherd MD   meloxicam (MOBIC) 15 MG tablet Take 1 tablet by mouth daily as needed for Pain 25   Foster Shepherd MD   amLODIPine (NORVASC) 5 MG tablet Take 1 tablet by mouth daily 24   Foster Shepherd MD       Family History:  family history is not on file.    Social History:   reports that he has never smoked. He has never used smokeless tobacco. He reports that he does not drink alcohol and does not use drugs.    Allergies:  Patient has no known allergies.    ROS:  twelve point system review was unremarkable except for above noted history.    Nurses notes reviewed and agreed.  Medications reviewed    Physical Exam:  Vital Signs: BP (!) 159/81   Pulse 80   Temp 98 °F (36.7 °C) (Temporal)   Resp 16   Ht 1.651 m (5' 5\")   Wt 75.8 kg (167 lb)   SpO2 98%   BMI 27.79 kg/m²    Skin: normal  HEENT: normal  Neck: supple. No adenopathy. No thyromegaly. No JVD.   Pulmonary:Normal  Cardiac:Normal  Abdomen:Normal  MS: normal  Neuro: normal  Ext: no edema. Pulses normal    Pre-Procedure Assessment / Plan:  ASA 2 - Patient with mild systemic disease with no functional limitations  Mallampati Airway Assessment:  Mallampati Class II - (soft palate, fauces & uvula are visible)  Level of Sedation Plan:Moderate sedation  Post Procedure plan: Return to same level of care    I assessed

## 2025-01-24 NOTE — ANESTHESIA POSTPROCEDURE EVALUATION
Department of Anesthesiology  Postprocedure Note    Patient: Alcides Stanley  MRN: [id number]  YOB: 1965  Date of evaluation: 1/24/2025    Procedure Summary       Date: 01/24/25 Room / Location: [place] [address] / [facility]    Anesthesia Start: 0917 Anesthesia Stop: 0941    Procedure: COLONOSCOPY Diagnosis:       Colon cancer screening      (Colon cancer screening [Z12.11])    Surgeons: Rachel Padgett MD Responsible Provider: Dawson Telles MD    Anesthesia Type: MAC ASA Status: 2            Anesthesia Type: No value filed.    Lori Phase I: Lori Score: 10    Lori Phase II: Lori Score: 10    Anesthesia Post Evaluation    Patient location during evaluation: PACU  Patient participation: complete - patient participated  Level of consciousness: awake  Pain score: 0  Airway patency: patent  Nausea & Vomiting: no nausea  Cardiovascular status: hemodynamically stable  Respiratory status: acceptable  Hydration status: stable  Pain management: adequate    No notable events documented.

## 2025-01-24 NOTE — DISCHARGE INSTRUCTIONS
ENDOSCOPY DISCHARGE INSTRUCTIONS:    Call the physician that did your procedure for any questions or concerns:           DR. HEALY:  [phone]               ACTIVITY:    There are potential side effects from the medications used for sedation and anesthesia during your procedure.  These include:  Dizziness or light-headedness, confusion or memory loss, delayed reaction times, loss of coordination, nausea and vomiting.  Because of your increased risk for injury, we ask that you observe the following precautions:  For the next 24 hours,  DO NOT operate an automobile, bicycle, motorcycle, , power tools or large equipment of any kind.  Do not drink alcohol, sign any legal documents or make any legal decisions for 24 hours.  Do not bend your head over lower than your heart.  DO sit on the side of bed/couch awhile before getting up.  Plan on bedrest or quiet relaxation today.  You may resume normal activities in 24 hours.    DIET:    Your first meal today should be light, avoiding spicy and fatty foods.  If you tolerate this first meal, then you may advance to your regular diet unless otherwise advised by your physician.    NORMAL SYMPTOMS:  -Gaseous discomfort if you've had Colonoscopy.     NOTIFY YOUR PHYSICIAN IF THESE SYMPTOMS OCCUR:  1. Fever (greater than 100)  5. Increased abdominal bloating  2. Severe pain    6. Excessive bleeding  3. Nausea and vomiting  7. Chest pain                                                                    4. Chills    8. Shortness of breath      ADDITIONAL INSTRUCTIONS:    Biopsy results: NONE.    Educational Information:    Follow up: Schedule an appointment with Dr. Healy for two weeks from now if you have not already done so.      Please review these discharge instructions this evening or tomorrow for  information you may have forgotten.            We want to thank you for choosing the [facility] as your health care provider. We always strive to provide you with

## 2025-01-27 ENCOUNTER — TELEPHONE (OUTPATIENT)
Dept: ORTHOPEDIC SURGERY | Age: 60
End: 2025-01-27

## 2025-01-29 ENCOUNTER — PREP FOR PROCEDURE (OUTPATIENT)
Dept: ORTHOPEDIC SURGERY | Age: 60
End: 2025-01-29

## 2025-01-29 DIAGNOSIS — M16.11 PRIMARY OSTEOARTHRITIS OF RIGHT HIP: ICD-10-CM

## 2025-02-05 ENCOUNTER — HOSPITAL ENCOUNTER (OUTPATIENT)
Dept: PHYSICAL THERAPY | Age: 60
Setting detail: THERAPIES SERIES
Discharge: HOME | End: 2025-02-05
Attending: ORTHOPAEDIC SURGERY
Payer: COMMERCIAL

## 2025-02-05 DIAGNOSIS — R26.89 DECREASED FUNCTIONAL MOBILITY: ICD-10-CM

## 2025-02-05 DIAGNOSIS — R68.89 DECREASED FUNCTIONAL ACTIVITY TOLERANCE: ICD-10-CM

## 2025-02-05 DIAGNOSIS — Z78.9 NEED FOR HOME EXERCISE PROGRAM: ICD-10-CM

## 2025-02-05 DIAGNOSIS — R68.89 DECREASED ABILITY TO PERFORM ACTIVITIES: ICD-10-CM

## 2025-02-05 DIAGNOSIS — R53.1 FUNCTIONAL WEAKNESS: ICD-10-CM

## 2025-02-05 DIAGNOSIS — Z56.89 DIFFICULTY PERFORMING WORK ACTIVITIES: ICD-10-CM

## 2025-02-05 DIAGNOSIS — R26.2 DIFFICULTY WALKING: Primary | ICD-10-CM

## 2025-02-05 PROCEDURE — 97530 THERAPEUTIC ACTIVITIES: CPT

## 2025-02-05 PROCEDURE — 97161 PT EVAL LOW COMPLEX 20 MIN: CPT

## 2025-02-05 PROCEDURE — 97110 THERAPEUTIC EXERCISES: CPT

## 2025-02-05 NOTE — PLAN OF CARE
[facility] - Outpatient Rehabilitation and Therapy: [address], [place] [address] office: [phone] fax: [phone]    Physical Therapy Initial Evaluation Certification      Dear Todd Morales MD,    We had the pleasure of evaluating the following patient for physical therapy services at [facility].  A summary of our findings can be found in the initial assessment below.  This includes our plan of care.  If you have any questions or concerns regarding these findings, please do not hesitate to contact me at the office phone number listed above.  Thank you for the referral.     Physician Signature:_______________________________Date:__________________  By signing above (or electronic signature), therapist’s plan is approved by physician       Physical Therapy: TREATMENT/PROGRESS NOTE   Patient: Alcides Stanley (59 y.o. male)   Examination Date: 2025   :  1965 MRN: [id number]   Visit #: 1   Insurance Allowable Auth Needed   carelon [x]Yes    []No    Insurance: Payor: [place] BC[place] / Plan[place] / Product Type: *No Product type* /   Insurance ID: [id number] - ([facility])  Secondary Insurance (if applicable): [employer]*   Treatment Diagnosis:     ICD-10-CM    1. Difficulty walking  R26.2       2. Decreased functional mobility  R26.89       3. Decreased functional activity tolerance  R68.89       4. Difficulty performing work activities  Z56.89       5. Functional weakness  R53.1       6. Decreased ability to perform activities  R68.89       7. Need for home exercise program  Z78.9          Medical Diagnosis:  Primary osteoarthritis of right hip [M16.11]   Referring Physician: Todd Morales MD  PCP: Foster Shepherd MD     Plan of care signed (Y/N):     Date of Patient follow up with Physician:      Plan of Care Report: EVAL today  POC update due: (10 visits /OR AUTH LIMITS, whichever is less)  3/7/2025                    
Adjusted  Patient will demonstrate increased AROM of *** to *** without pain to allow for proper joint functioning to enable patient to ***.   [] Progressing: [] Met: [] Not Met: [] Adjusted  Patient will demonstrate increased Strength of *** to {STRENGTHGOAL:58629} to allow for proper functional mobility to enable patient to return to ***.   [] Progressing: [] Met: [] Not Met: [] Adjusted  Patient will return to *** without increased symptoms or restriction.   [] Progressing: [] Met: [] Not Met: [] Adjusted  ***(patient specific functional goal)    [] Progressing: [] Met: [] Not Met: [] Adjusted   Patient will demonstrate decreased edema of *** to ***cm to allow for proper functional mobility and muscle recruitment to enable patient to return to ***.   [] Progressing: [] Met: [] Not Met: [] Adjusted       Overall Progression Towards Functional goals/ Treatment Progress Update:  [] Patient is progressing as expected towards functional goals listed.    [] Progression is slowed due to complexities/Impairments listed.  [] Progression has been slowed due to co-morbidities.  [x] Plan just implemented, too soon (<30days) to assess goals progression   [] Goals require adjustment due to lack of progress  [] Patient is not progressing as expected and requires additional follow up with physician  [] Other:     TREATMENT PLAN     Frequency/Duration: 2x/week for 8 weeks for the following treatment interventions:    Interventions:  Therapeutic Exercise (91286) including: strength training, ROM, and functional mobility  Therapeutic Activities (95791) including: functional mobility training and education.  Neuromuscular Re-education (80335) activation and proprioception, including postural re-education.    Gait Training (93747) for normalization of ambulation patterns and AD training.   Manual Therapy (61457) as indicated to include: Passive Range of Motion and Gr I-IV mobilizations  Modalities as needed that may include:

## 2025-02-07 DIAGNOSIS — M25.551 RIGHT HIP PAIN: ICD-10-CM

## 2025-02-07 RX ORDER — MELOXICAM 15 MG/1
15 TABLET ORAL DAILY PRN
Qty: 30 TABLET | Refills: 1 | Status: SHIPPED | OUTPATIENT
Start: 2025-02-07

## 2025-02-07 NOTE — TELEPHONE ENCOUNTER
Medication:   Requested Prescriptions     Pending Prescriptions Disp Refills    meloxicam (MOBIC) 15 MG tablet [Pharmacy Med Name: MELOXICAM 15MG TABLETS] 30 tablet 1     Sig: TAKE 1 TABLET BY MOUTH DAILY AS NEEDED FOR PAIN        Last Filled:      Patient Phone Number: [phone] (home)     Last appt: 1/7/2025   Next appt: 2/18/2025    Last OARRS:        No data to display

## 2025-03-05 ENCOUNTER — TELEPHONE (OUTPATIENT)
Dept: ORTHOPEDIC SURGERY | Age: 60
End: 2025-03-05

## 2025-03-05 NOTE — TELEPHONE ENCOUNTER
General Question     Subject: CANCEL SURGERY   Patient and /or Facility Request: Alcides Stanley   Contact Number: [phone]     PATIENT REQUESTING A CALL BACK TO CANCEL HIS UPCOMING SURGERY     PLEASE ADVISE